# Patient Record
Sex: FEMALE | Race: OTHER | Employment: UNEMPLOYED | URBAN - METROPOLITAN AREA
[De-identification: names, ages, dates, MRNs, and addresses within clinical notes are randomized per-mention and may not be internally consistent; named-entity substitution may affect disease eponyms.]

---

## 2022-05-19 ENCOUNTER — HOSPITAL ENCOUNTER (OUTPATIENT)
Age: 32
Discharge: OTHER INSTITUTION | End: 2022-05-19
Payer: MEDICAID

## 2022-05-19 VITALS
HEART RATE: 110 BPM | HEIGHT: 65 IN | WEIGHT: 110 LBS | RESPIRATION RATE: 18 BRPM | OXYGEN SATURATION: 100 % | SYSTOLIC BLOOD PRESSURE: 95 MMHG | BODY MASS INDEX: 18.33 KG/M2 | TEMPERATURE: 100 F | DIASTOLIC BLOOD PRESSURE: 66 MMHG

## 2022-05-19 DIAGNOSIS — R10.9 ABDOMINAL PAIN OF UNKNOWN ETIOLOGY: Primary | ICD-10-CM

## 2022-05-19 DIAGNOSIS — R55 SYNCOPE, NEAR: ICD-10-CM

## 2022-05-19 PROCEDURE — 99204 OFFICE O/P NEW MOD 45 MIN: CPT | Performed by: NURSE PRACTITIONER

## 2024-01-05 ENCOUNTER — HOSPITAL ENCOUNTER (OUTPATIENT)
Age: 34
Discharge: HOME | End: 2024-01-05
Payer: MEDICAID

## 2024-01-05 VITALS
DIASTOLIC BLOOD PRESSURE: 67 MMHG | OXYGEN SATURATION: 97 % | RESPIRATION RATE: 20 BRPM | TEMPERATURE: 98 F | SYSTOLIC BLOOD PRESSURE: 120 MMHG | HEART RATE: 70 BPM

## 2024-01-05 DIAGNOSIS — J06.9 UPPER RESPIRATORY TRACT INFECTION, UNSPECIFIED TYPE: Primary | ICD-10-CM

## 2024-01-05 NOTE — ED PROVIDER NOTES
Patient Seen in: [facility]mediate [facility]      History     Chief Complaint   Patient presents with    Sore Throat     Stated Complaint: headaches /chills/sore throat x 6 weeks on and off    Subjective:   HPI    Patient is a 33-year-old female, with anemia, here today with complaints of intermittent chills, sore throat, headaches over the past 6 weeks.  She reports that all of her children have the same symptoms.  She reports taking over-the-counter herbal medications, vitamins, patient reports that this is not working.  She was seen yesterday at an immediate care, diagnosed with a viral process, COVID, influenza, strep test were all negative yesterday.  Patient is here today with the same symptoms.  She denies any worsening of symptoms.      Objective:   Past Medical History:   Diagnosis Date    Anemia               Past Surgical History:   Procedure Laterality Date    OTHER      cyst removal , \"sweat gland ca\"                 Social History     Socioeconomic History    Marital status: Single   Tobacco Use    Smoking status: Never     Passive exposure: Never    Smokeless tobacco: Never   Vaping Use    Vaping Use: Never used   Substance and Sexual Activity    Alcohol use: Never    Drug use: Never              Review of Systems    Positive for stated complaint: headaches /chills/sore throat x 6 weeks on and off  Other systems are as noted in HPI.  Constitutional and vital signs reviewed.      All other systems reviewed and negative except as noted above.    Physical Exam     ED Triage Vitals [01/05/24 1050]   /67   Pulse 70   Resp 20   Temp 97.7 °F (36.5 °C)   Temp src Temporal   SpO2 97 %   O2 Device None (Room air)       Current:/67   Pulse 70   Temp 97.7 °F (36.5 °C) (Temporal)   Resp 20   LMP 12/11/2023   SpO2 97%         Sore throat exam:     VS: Vital signs reviewed. O2 saturation within normal limits for this patient     General: Patient is awake and alert, oriented to person, place  and time. Not in acute distress.      HEENT: Head is normocephalic atraumatic.  No scleral injection.  Posterior oropharynx reveals bilateral tonsillar enlargement and erythema without exudates.  No unilateral tonsillar swelling or uvula deviation.  Tympanic membranes gray without bulging.  Landmarks intact. No trismus. Mucous membranes moist.  No oral pallor.  No oral vesicles.     Neck: No cervical lymphadenopathy. Supple. No meningsmus.      Heart: S1-S2.  Regular rate and rhythm.       Lungs: good inspiratory effort. +air entry bilaterally without wheezes, rhonchi, crackles.  No accessory muscle use or tachypnea.       Extremities: No edema.  Pulses 2+ extremities.        Skin: No rash noted.  No ecchymosis.       CNS: Moves all 4 extremities.  Interacts appropriately.    Differential Diagnosis: viral pharyngitis, strep,  covid, uri     ED Course   Labs Reviewed - No data to display       I have personally  reviewed available prior medical records for any recent pertinent discharge summaries/testing. Patient/family updated on results and plan, a verbalized understanding and agreement with the plan.  I explained to the patient that emergent conditions may arise and to go to the ER for new, worsening or any persistent conditions. I've explained the importance of taking all medicatons as prescribed, follow up, and return precuations,  All questions answered.    Please note that this report has been produced using speech recognition software and may contain errors related to that system including, but not limited to, errors in grammar, punctuation, and spelling, as well as words and phrases that possibly may have been recognized inappropriately.  If there are any questions or concerns, contact the dictating provider for clarification.         MDM    Patient is a 33-year-old female, with anemia, here today with complaints of intermittent chills, sore throat, headaches over the past 6 weeks.  She reports that all of  her children have the same symptoms.  She reports taking over-the-counter herbal medications, vitamins, patient reports that this is not working.  She was seen yesterday at an immediate care, diagnosed with a viral process, COVID, influenza, strep test were all negative yesterday.  Patient is here today with the same symptoms.  She denies any worsening of symptoms.    Patient presents with sore throat, cough, intermittent headaches.  Children all have the same symptoms.  Nontoxic, does not meet SIRS criteria.   Patient does not have uvula deviation or unilateral tonsillar swelling to indicate tonsillar abscess. No meningsmus or trismus. No dysphagia or difficulty handing secretions. No evidence for otitis media. Patient does not have any respiratory distress.  O2 saturation within normal limits for this patient. Does not appear clinically dehydrated and is tolerating oral intake. Presentation consistent with a viral process. Encouraged patient on oral hydration and supportive care. Recommend follow up with PCP.  Return to ED precautions discussed with the patient and family.                                   Medical Decision Making      Disposition and Plan     Clinical Impression:  1. Upper respiratory tract infection, unspecified type         Disposition:  Discharge  1/5/2024 11:16 am    Follow-up:  Uzair Watson  [id number] [address]  [phone]                Medications Prescribed:  Discharge Medication List as of 1/5/2024 11:23 AM